# Patient Record
Sex: MALE | Race: WHITE | NOT HISPANIC OR LATINO | URBAN - METROPOLITAN AREA
[De-identification: names, ages, dates, MRNs, and addresses within clinical notes are randomized per-mention and may not be internally consistent; named-entity substitution may affect disease eponyms.]

---

## 2019-03-31 ENCOUNTER — EMERGENCY (EMERGENCY)
Facility: HOSPITAL | Age: 54
LOS: 1 days | Discharge: HOME | End: 2019-03-31
Attending: EMERGENCY MEDICINE | Admitting: EMERGENCY MEDICINE
Payer: COMMERCIAL

## 2019-03-31 VITALS
OXYGEN SATURATION: 95 % | HEART RATE: 82 BPM | DIASTOLIC BLOOD PRESSURE: 87 MMHG | RESPIRATION RATE: 18 BRPM | TEMPERATURE: 98 F | WEIGHT: 199.96 LBS | SYSTOLIC BLOOD PRESSURE: 148 MMHG | HEIGHT: 72 IN

## 2019-03-31 VITALS
SYSTOLIC BLOOD PRESSURE: 144 MMHG | TEMPERATURE: 98 F | HEART RATE: 86 BPM | DIASTOLIC BLOOD PRESSURE: 84 MMHG | OXYGEN SATURATION: 96 % | RESPIRATION RATE: 17 BRPM

## 2019-03-31 PROCEDURE — 99283 EMERGENCY DEPT VISIT LOW MDM: CPT | Mod: 25

## 2019-03-31 PROCEDURE — 73562 X-RAY EXAM OF KNEE 3: CPT | Mod: 26,RT

## 2019-03-31 PROCEDURE — 99283 EMERGENCY DEPT VISIT LOW MDM: CPT

## 2019-03-31 PROCEDURE — 73562 X-RAY EXAM OF KNEE 3: CPT

## 2019-03-31 RX ORDER — OXYCODONE AND ACETAMINOPHEN 5; 325 MG/1; MG/1
1 TABLET ORAL ONCE
Qty: 0 | Refills: 0 | Status: DISCONTINUED | OUTPATIENT
Start: 2019-03-31 | End: 2019-03-31

## 2019-03-31 RX ORDER — IBUPROFEN 200 MG
1 TABLET ORAL
Qty: 20 | Refills: 0 | OUTPATIENT
Start: 2019-03-31 | End: 2019-04-04

## 2019-03-31 RX ADMIN — OXYCODONE AND ACETAMINOPHEN 1 TABLET(S): 5; 325 TABLET ORAL at 17:10

## 2019-03-31 RX ADMIN — OXYCODONE AND ACETAMINOPHEN 1 TABLET(S): 5; 325 TABLET ORAL at 16:40

## 2019-03-31 NOTE — ED PROVIDER NOTE - NEURO NEGATIVE STATEMENT, MLM
- - - no loss of consciousness, no gait abnormality, no headache, no sensory deficits, and no weakness.
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
## 2019-03-31 NOTE — ED PROVIDER NOTE - NSFOLLOWUPINSTRUCTIONS_ED_ALL_ED_FT
Return to the ED for any new or worsening symptoms  Take your medication as prescribed  Motrin per label instructions for mild to moderate pain   Percocet per label instructions as needed for severe pain   Knee immobilizer as instructed   Crutches as needed for comfort  Elevate leg to reduce swelling   Ice to affected knee on 20 min off 40 min as needed for pain and swelling   Follow up with orthopedics call in the morning to schedule follow up  Advance activity as tolerated

## 2019-03-31 NOTE — ED PROVIDER NOTE - CARE PROVIDER_API CALL
Dave Torres (MD)  Orthopaedic Surgery  [address] [place] [address]  Phone: [phone]  Fax: [phone]  Follow Up Time:
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
## 2019-03-31 NOTE — ED PROVIDER NOTE - CLINICAL SUMMARY MEDICAL DECISION MAKING FREE TEXT BOX
Pt is a 54 yo male who presents to the ED with a cc of knee pain.  Pt reports symptoms since Friday.  NVI.  Concern for ligament injury.  Will obtain x-ray, and medicate for pain. Will likely need outpatient MRI and orthopedic follow up

## 2019-03-31 NOTE — ED PROVIDER NOTE - PHYSICAL EXAMINATION
Right LE: No TTP to hip, anterior tib/fib, ankle, foot.  No TTP to anterior aspect of patellar, TTP to posterior aspect of right knee with mild swelling, no skin changes noted, limited ROM in all planes secondary to pain but no significant joint instability noted.  Sensation grossly intact to lower ext, +pedal pulse, cap refill less then 2 seconds

## 2019-03-31 NOTE — ED PROVIDER NOTE - PROGRESS NOTE DETAILS
No acute fracture noted.  High suspicion for ligament, tendon or possible meniscal injury.  Pt advised on need for orthopedic follow up for possible MRI.  Will provide knee immobilizer, and crutches for comfort.  Orthopedic follow up provided.  All questions answered.  Pt is neurovascularly intact.  Copy of results of x-ray provided

## 2019-03-31 NOTE — ED ADULT NURSE NOTE - NSIMPLEMENTINTERV_GEN_ALL_ED
Implemented All Universal Safety Interventions:  [place] to call system. Call bell, personal items and telephone within reach. Instruct patient to call for assistance. Room bathroom lighting operational. Non-slip footwear when patient is off stretcher. Physically safe environment: no spills, clutter or unnecessary equipment. Stretcher in lowest position, wheels locked, appropriate side rails in place.
